# Patient Record
Sex: FEMALE | Race: WHITE | Employment: OTHER | URBAN - METROPOLITAN AREA
[De-identification: names, ages, dates, MRNs, and addresses within clinical notes are randomized per-mention and may not be internally consistent; named-entity substitution may affect disease eponyms.]

---

## 2017-11-11 ENCOUNTER — HOSPITAL ENCOUNTER (OUTPATIENT)
Dept: VASCULAR LAB | Age: 67
Discharge: HOME | End: 2017-11-11
Attending: ORTHOPAEDIC SURGERY | Admitting: ORTHOPAEDIC SURGERY

## 2017-11-11 DIAGNOSIS — M79.89 OTHER SPECIFIED SOFT TISSUE DISORDERS (CODE): ICD-10-CM

## 2018-10-29 ENCOUNTER — HOSPITAL ENCOUNTER (OUTPATIENT)
Dept: GENERAL RADIOLOGY | Age: 68
Discharge: HOME | End: 2018-10-29
Payer: MEDICARE

## 2018-10-29 ENCOUNTER — HOSPITAL ENCOUNTER (OUTPATIENT)
Age: 68
Discharge: HOME | End: 2018-10-29
Payer: MEDICARE

## 2018-10-29 DIAGNOSIS — J45.40 MODERATE PERSISTENT ASTHMA, UNSPECIFIED WHETHER COMPLICATED: ICD-10-CM

## 2018-10-29 PROCEDURE — 71046 X-RAY EXAM CHEST 2 VIEWS: CPT

## 2018-11-14 ENCOUNTER — HOSPITAL ENCOUNTER (OUTPATIENT)
Dept: CT IMAGING | Age: 68
Discharge: HOME | End: 2018-11-14
Payer: MEDICARE

## 2018-11-14 ENCOUNTER — HOSPITAL ENCOUNTER (OUTPATIENT)
Age: 68
Discharge: HOME | End: 2018-11-14
Payer: MEDICARE

## 2018-11-14 DIAGNOSIS — R05.9 COUGH: ICD-10-CM

## 2018-11-14 DIAGNOSIS — J45.41: ICD-10-CM

## 2018-11-14 LAB
BASOPHILS ABSOLUTE: 0 K/UL (ref 0–0.2)
BASOPHILS RELATIVE PERCENT: 0.3 %
EOSINOPHILS ABSOLUTE: 0.1 K/UL (ref 0–0.6)
EOSINOPHILS RELATIVE PERCENT: 2 %
HCT VFR BLD CALC: 40.4 % (ref 36–48)
HEMOGLOBIN: 13.4 G/DL (ref 12–16)
LYMPHOCYTES ABSOLUTE: 1.1 K/UL (ref 1–5.1)
LYMPHOCYTES RELATIVE PERCENT: 15.6 %
MCH RBC QN AUTO: 32.5 PG (ref 26–34)
MCHC RBC AUTO-ENTMCNC: 33.1 G/DL (ref 31–36)
MCV RBC AUTO: 98.3 FL (ref 80–100)
MONOCYTES ABSOLUTE: 0.5 K/UL (ref 0–1.3)
MONOCYTES RELATIVE PERCENT: 7.3 %
NEUTROPHILS ABSOLUTE: 5.3 K/UL (ref 1.7–7.7)
NEUTROPHILS RELATIVE PERCENT: 74.8 %
PDW BLD-RTO: 13.6 % (ref 12.4–15.4)
PLATELET # BLD: 181 K/UL (ref 135–450)
PMV BLD AUTO: 7.9 FL (ref 5–10.5)
RBC # BLD: 4.12 M/UL (ref 4–5.2)
WBC # BLD: 7.1 K/UL (ref 4–11)

## 2018-11-14 PROCEDURE — 36415 COLL VENOUS BLD VENIPUNCTURE: CPT

## 2018-11-14 PROCEDURE — 85025 COMPLETE CBC W/AUTO DIFF WBC: CPT

## 2018-11-14 PROCEDURE — 71250 CT THORAX DX C-: CPT

## 2022-08-11 NOTE — PROGRESS NOTES

## 2022-08-11 NOTE — PROGRESS NOTES
Obstructive Sleep Apnea (CIARA) Screening     Patient:  Brandee Smith    YOB: 1950      Medical Record #:  [id number]                     Date:  8/11/2022     1. Are you a loud and/or regular snorer? []  Yes       [x] No    2. Have you been observed to gasp or stop breathing during sleep? []  Yes       [x] No    3. Do you feel tired or groggy upon awakening or do you awaken with a headache?           []  Yes       [] No    4. Are you often tired or fatigued during the wake time hours? []  Yes       [] No    5. Do you fall asleep sitting, reading, watching TV or driving? []  Yes       [] No    6. Do you often have problems with memory or concentration? []  Yes       [] No    **If patient's score is ? 3 they are considered high risk for CIARA. An Anesthesia provider will evaluate the patient and develop a plan of care the day of surgery. Note:  If the patient's BMI is more than 35 kg m¯² , has neck circumference > 40 cm, and/or high blood pressure the risk is greater (© [employer], 2006).

## 2022-08-18 ENCOUNTER — ANESTHESIA (OUTPATIENT)
Dept: ENDOSCOPY | Age: 72
End: 2022-08-18
Payer: MEDICARE

## 2022-08-18 ENCOUNTER — ANESTHESIA EVENT (OUTPATIENT)
Dept: ENDOSCOPY | Age: 72
End: 2022-08-18
Payer: MEDICARE

## 2022-08-18 ENCOUNTER — HOSPITAL ENCOUNTER (OUTPATIENT)
Age: 72
Setting detail: OUTPATIENT SURGERY
Discharge: HOME | End: 2022-08-18
Attending: INTERNAL MEDICINE | Admitting: INTERNAL MEDICINE
Payer: MEDICARE

## 2022-08-18 VITALS
RESPIRATION RATE: 12 BRPM | HEIGHT: 65 IN | DIASTOLIC BLOOD PRESSURE: 61 MMHG | TEMPERATURE: 97.8 F | HEART RATE: 68 BPM | WEIGHT: 165 LBS | OXYGEN SATURATION: 99 % | SYSTOLIC BLOOD PRESSURE: 107 MMHG | BODY MASS INDEX: 27.49 KG/M2

## 2022-08-18 PROCEDURE — 6360000002 HC RX W HCPCS: Performed by: NURSE ANESTHETIST, CERTIFIED REGISTERED

## 2022-08-18 PROCEDURE — 2709999900 HC NON-CHARGEABLE SUPPLY: Performed by: INTERNAL MEDICINE

## 2022-08-18 PROCEDURE — 3700000000 HC ANESTHESIA ATTENDED CARE: Performed by: INTERNAL MEDICINE

## 2022-08-18 PROCEDURE — 7100000011 HC PHASE II RECOVERY - ADDTL 15 MIN: Performed by: INTERNAL MEDICINE

## 2022-08-18 PROCEDURE — 2580000003 HC RX 258: Performed by: ANESTHESIOLOGY

## 2022-08-18 PROCEDURE — 7100000010 HC PHASE II RECOVERY - FIRST 15 MIN: Performed by: INTERNAL MEDICINE

## 2022-08-18 PROCEDURE — 2500000003 HC RX 250 WO HCPCS: Performed by: NURSE ANESTHETIST, CERTIFIED REGISTERED

## 2022-08-18 PROCEDURE — 3609027000 HC COLONOSCOPY: Performed by: INTERNAL MEDICINE

## 2022-08-18 PROCEDURE — 3700000001 HC ADD 15 MINUTES (ANESTHESIA): Performed by: INTERNAL MEDICINE

## 2022-08-18 RX ORDER — SODIUM CHLORIDE, SODIUM LACTATE, POTASSIUM CHLORIDE, CALCIUM CHLORIDE 600; 310; 30; 20 MG/100ML; MG/100ML; MG/100ML; MG/100ML
INJECTION, SOLUTION INTRAVENOUS ONCE
Status: DISCONTINUED | OUTPATIENT
Start: 2022-08-18 | End: 2022-08-18 | Stop reason: HOSPADM

## 2022-08-18 RX ORDER — PROPOFOL 10 MG/ML
INJECTION, EMULSION INTRAVENOUS PRN
Status: DISCONTINUED | OUTPATIENT
Start: 2022-08-18 | End: 2022-08-18 | Stop reason: SDUPTHER

## 2022-08-18 RX ORDER — SODIUM CHLORIDE, SODIUM LACTATE, POTASSIUM CHLORIDE, CALCIUM CHLORIDE 600; 310; 30; 20 MG/100ML; MG/100ML; MG/100ML; MG/100ML
INJECTION, SOLUTION INTRAVENOUS CONTINUOUS
Status: DISCONTINUED | OUTPATIENT
Start: 2022-08-18 | End: 2022-08-18 | Stop reason: HOSPADM

## 2022-08-18 RX ORDER — SODIUM CHLORIDE 0.9 % (FLUSH) 0.9 %
5-40 SYRINGE (ML) INJECTION PRN
Status: DISCONTINUED | OUTPATIENT
Start: 2022-08-18 | End: 2022-08-18 | Stop reason: HOSPADM

## 2022-08-18 RX ORDER — GLYCOPYRROLATE 0.2 MG/ML
INJECTION INTRAMUSCULAR; INTRAVENOUS PRN
Status: DISCONTINUED | OUTPATIENT
Start: 2022-08-18 | End: 2022-08-18 | Stop reason: SDUPTHER

## 2022-08-18 RX ORDER — SODIUM CHLORIDE 9 MG/ML
INJECTION, SOLUTION INTRAVENOUS PRN
Status: DISCONTINUED | OUTPATIENT
Start: 2022-08-18 | End: 2022-08-18 | Stop reason: HOSPADM

## 2022-08-18 RX ORDER — LIDOCAINE HYDROCHLORIDE 10 MG/ML
0.1 INJECTION, SOLUTION EPIDURAL; INFILTRATION; INTRACAUDAL; PERINEURAL
Status: DISCONTINUED | OUTPATIENT
Start: 2022-08-18 | End: 2022-08-18 | Stop reason: HOSPADM

## 2022-08-18 RX ORDER — SODIUM CHLORIDE 0.9 % (FLUSH) 0.9 %
5-40 SYRINGE (ML) INJECTION EVERY 12 HOURS SCHEDULED
Status: DISCONTINUED | OUTPATIENT
Start: 2022-08-18 | End: 2022-08-18 | Stop reason: HOSPADM

## 2022-08-18 RX ORDER — LIDOCAINE HYDROCHLORIDE 10 MG/ML
0.3 INJECTION, SOLUTION EPIDURAL; INFILTRATION; INTRACAUDAL; PERINEURAL
Status: DISCONTINUED | OUTPATIENT
Start: 2022-08-18 | End: 2022-08-18 | Stop reason: HOSPADM

## 2022-08-18 RX ADMIN — LIDOCAINE HYDROCHLORIDE 30 MG: 10 INJECTION, SOLUTION INFILTRATION; PERINEURAL at 13:13

## 2022-08-18 RX ADMIN — PROPOFOL 50 MG: 10 INJECTION, EMULSION INTRAVENOUS at 13:11

## 2022-08-18 RX ADMIN — PROPOFOL 20 MG: 10 INJECTION, EMULSION INTRAVENOUS at 13:33

## 2022-08-18 RX ADMIN — PROPOFOL 20 MG: 10 INJECTION, EMULSION INTRAVENOUS at 13:29

## 2022-08-18 RX ADMIN — GLYCOPYRROLATE 0.2 MG: 0.2 INJECTION, SOLUTION INTRAMUSCULAR; INTRAVENOUS at 13:17

## 2022-08-18 RX ADMIN — PROPOFOL 20 MG: 10 INJECTION, EMULSION INTRAVENOUS at 13:23

## 2022-08-18 RX ADMIN — SODIUM CHLORIDE, POTASSIUM CHLORIDE, SODIUM LACTATE AND CALCIUM CHLORIDE: 600; 310; 30; 20 INJECTION, SOLUTION INTRAVENOUS at 12:41

## 2022-08-18 RX ADMIN — LIDOCAINE HYDROCHLORIDE 50 MG: 10 INJECTION, SOLUTION INFILTRATION; PERINEURAL at 13:10

## 2022-08-18 RX ADMIN — PROPOFOL 20 MG: 10 INJECTION, EMULSION INTRAVENOUS at 13:15

## 2022-08-18 RX ADMIN — PROPOFOL 20 MG: 10 INJECTION, EMULSION INTRAVENOUS at 13:27

## 2022-08-18 RX ADMIN — PROPOFOL 40 MG: 10 INJECTION, EMULSION INTRAVENOUS at 13:37

## 2022-08-18 RX ADMIN — PROPOFOL 20 MG: 10 INJECTION, EMULSION INTRAVENOUS at 13:19

## 2022-08-18 RX ADMIN — PROPOFOL 20 MG: 10 INJECTION, EMULSION INTRAVENOUS at 13:35

## 2022-08-18 RX ADMIN — PROPOFOL 40 MG: 10 INJECTION, EMULSION INTRAVENOUS at 13:39

## 2022-08-18 RX ADMIN — PROPOFOL 20 MG: 10 INJECTION, EMULSION INTRAVENOUS at 13:31

## 2022-08-18 RX ADMIN — PROPOFOL 30 MG: 10 INJECTION, EMULSION INTRAVENOUS at 13:12

## 2022-08-18 RX ADMIN — PROPOFOL 20 MG: 10 INJECTION, EMULSION INTRAVENOUS at 13:21

## 2022-08-18 RX ADMIN — PROPOFOL 20 MG: 10 INJECTION, EMULSION INTRAVENOUS at 13:18

## 2022-08-18 RX ADMIN — PROPOFOL 20 MG: 10 INJECTION, EMULSION INTRAVENOUS at 13:13

## 2022-08-18 RX ADMIN — PROPOFOL 20 MG: 10 INJECTION, EMULSION INTRAVENOUS at 13:25

## 2022-08-18 RX ADMIN — PROPOFOL 20 MG: 10 INJECTION, EMULSION INTRAVENOUS at 13:16

## 2022-08-18 ASSESSMENT — PAIN - FUNCTIONAL ASSESSMENT: PAIN_FUNCTIONAL_ASSESSMENT: 0-10

## 2022-08-18 ASSESSMENT — PAIN SCALES - GENERAL: PAINLEVEL_OUTOF10: 0

## 2022-08-18 NOTE — H&P
Gastroenterology Note             Preperative History and Physical    Patient: Joey Camacho  : 1950  CSN:     History Obtained From:  patient and/or guardian. HISTORY OF PRESENT ILLNESS:    The patient is a 80 y.o. female  here for colonoscopy. 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 Current Outpatient Medications on File Prior to Encounter   Medication Sig Dispense Refill    Denosumab (PROLIA SC) Inject into the skin every 6 months      fluticasone-salmeterol (ADVAIR) 100-50 MCG/DOSE diskus inhaler Inhale 2 puffs into the lungs every 12 hours      Multiple Vitamins-Minerals (MULTIVITAMIN PO) Take by mouth daily      Glucosamine HCl (GLUCOSAMINE PO) Take by mouth daily      Ascorbic Acid (VITAMIN C PO) Take by mouth daily      Calcium Carb-Cholecalciferol 1000-800 MG-UNIT TABS Take by mouth daily      albuterol sulfate  (90 Base) MCG/ACT inhaler Inhale 2 puffs into the lungs every 6 hours as needed for Wheezing          Allergies:  Patient has no known allergies.       Social History:   Social History     Tobacco Use    Smoking status: Never    Smokeless tobacco: Never   Substance Use Topics    Alcohol use: No     Family History:   Family History   Problem Relation Age of Onset    High Blood Pressure Mother     Hypothyroidism Mother     No Known Problems Father        PHYSICAL EXAM:      BP 127/68   Pulse 76   Temp 97.8 °F (36.6 °C) (Temporal)   Resp 16   Ht 5' 5\" (1.651 m)   Wt 165 lb (74.8 kg)   SpO2 97%   BMI 27.46 kg/m²  I        Heart:   RRR, normal s1s2    Lungs:  CTA bilat,  Normal effort    Abdomen:   NT, ND      ASA Grade:  ASA 2 - Patient with mild systemic disease with no functional limitations    Mallampati Class: 2          ASSESSMENT AND PLAN:    1. Patient is a 80 y.o. female here for Colonoscopy with MAC.   2.  Procedure options, risks and benefits reviewed with patient. Patient expresses understanding.     Marcelino Guerrero MD,   [address]   8/18/2022

## 2022-08-18 NOTE — DISCHARGE INSTRUCTIONS
PATIENT INSTRUCTIONS  POST-SEDATION    Fly Vargas          IMMEDIATELY FOLLOWING PROCEDURE:    Do not drive or operate machinery for the first twenty four hours after surgery. Do not make any important decisions for twenty four hours after surgery or while taking narcotic pain medications or sedatives. You should NOT BE LEFT UNATTENDED OR ALONE. A responsible adult should be with you for the rest of the day of your procedure and also during the night for your protection and safety. You may experience some light headedness. Rest at home with activity as tolerated. You may not need to go to bed, but it is important to rest for the next 24 hours. You should not engage in athletic sports such as basketball, volleyball, jogging, skating, or activities requiring refined motor skills for 24 hours. If you develop intractable nausea and vomiting or a severe headache please notify your doctor immediately. You are not expected to have any fever, but if you feel warm, take your temperature. If you have a fever 101 degrees or higher, call your doctor. If you have had an Endoscopy:   *Eat lightly for your first meal and gradually resume your normal / prescribed diet. DO NOT eat or drink until your gag reflex returns. *If you have a sore throat you may use lozenges, or salt water gargles. *If you have had a colonoscopy, do not expect a normal bowel movement for approximately three days due to the cleansing of the large intestine prior to colonoscopy. ONCE YOU ARE HOME, IF YOU SHOULD HAVE:  Difficulty in breathing, persistent nausea or vomiting, bleeding you feel is excessive, or pain that is unusual, increased abdominal bloating, or any swelling, fever / chills, call your physician. If you cannot contact your physician, but feel that your signs and symptoms need a physician's attention, go to the Emergency Department. FOLLOW-UP:    Please follow up with Dr. Cinthia Verde as scheduled or needed.     Call Dr. Denton Running if there are any GI concerns. [phone]. Repeat Colonoscopy in 5 years.

## 2022-08-18 NOTE — ANESTHESIA PRE PROCEDURE
Department of Anesthesiology  Preprocedure Note       Name:  Manish Zamarripa   Age:  80 y.o.  :  1950                                          MRN:  [id number]         Date:  2022      Surgeon: Destinee Tsang):  Joni Bacon MD    Procedure: Procedure(s):  COLONOSCOPY    Medications prior to admission:   Prior to Admission medications    Medication Sig Start Date End Date Taking?  Authorizing Provider   Denosumab (PROLIA SC) Inject into the skin every 6 months   Yes Historical Provider, MD   fluticasone-salmeterol (ADVAIR) 100-50 MCG/DOSE diskus inhaler Inhale 2 puffs into the lungs every 12 hours    Historical Provider, MD   Multiple Vitamins-Minerals (MULTIVITAMIN PO) Take by mouth daily    Historical Provider, MD   Glucosamine HCl (GLUCOSAMINE PO) Take by mouth daily    Historical Provider, MD   Ascorbic Acid (VITAMIN C PO) Take by mouth daily    Historical Provider, MD   Calcium Carb-Cholecalciferol 1000-800 MG-UNIT TABS Take by mouth daily    Historical Provider, MD   albuterol sulfate  (90 Base) MCG/ACT inhaler Inhale 2 puffs into the lungs every 6 hours as needed for Wheezing    Historical Provider, MD       Current medications:    Current Facility-Administered Medications   Medication Dose Route Frequency Provider Last Rate Last Admin    lactated ringers infusion   IntraVENous Once Joni Bacon MD        lidocaine PF 1 % injection 0.1 mL  0.1 mL IntraDERmal Once PRN Joni Bacon MD        lidocaine PF 1 % injection 0.3 mL  0.3 mL IntraDERmal Once PRN Elba Luna MD        lactated ringers infusion   IntraVENous Continuous Elba Luna MD        sodium chloride flush 0.9 % injection 5-40 mL  5-40 mL IntraVENous 2 times per day Elba Luna MD        sodium chloride flush 0.9 % injection 5-40 mL  5-40 mL IntraVENous PRN Elba Luna MD        0.9 % sodium chloride infusion   IntraVENous PRN Elba Luna MD           Allergies:  No Known Allergies    Problem List:  There is no problem list on file for this patient. 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    JOINT REPLACEMENT Left     TKR    KNEE CARTILAGE SURGERY Bilateral        Social History:    Social History     Tobacco Use    Smoking status: Never    Smokeless tobacco: Never   Substance Use Topics    Alcohol use: No                                Counseling given: Not Answered      Vital Signs (Current):   Vitals:    22 1400 22 1230   BP:  127/68   Pulse:  76   Resp:  16   Temp:  97.8 °F (36.6 °C)   TempSrc:  Temporal   SpO2:  97%   Weight: 165 lb (74.8 kg) 165 lb (74.8 kg)   Height: 5' 5\" (1.651 m) 5' 5\" (1.651 m)                                              BP Readings from Last 3 Encounters:   22 127/68   17 114/64       NPO Status: Time of last liquid consumption: 0800                        Time of last solid consumption: 1700                        Date of last liquid consumption: 22                        Date of last solid food consumption: 22    BMI:   Wt Readings from Last 3 Encounters:   22 165 lb (74.8 kg)   17 156 lb 12 oz (71.1 kg)     Body mass index is 27.46 kg/m².     CBC:   Lab Results   Component Value Date/Time    WBC 7.1 2018 10:13 AM    RBC 4.12 2018 10:13 AM    HGB 13.4 2018 10:13 AM    HCT 40.4 2018 10:13 AM    MCV 98.3 2018 10:13 AM    RDW 13.6 2018 10:13 AM     2018 10:13 AM       CMP:   Lab Results   Component Value Date/Time     2017 11:36 AM    K 4.2 2017 11:36 AM     2017 11:36 AM    CO2 26 11/11/2017 11:36 AM    BUN 17 11/11/2017 11:36 AM    CREATININE 0.5 11/11/2017 11:36 AM    GFRAA >60 11/11/2017 11:36 AM    AGRATIO 1.5 11/11/2017 11:36 AM    LABGLOM >60 11/11/2017 11:36 AM    GLUCOSE 87 11/11/2017 11:36 AM    PROT 6.2 11/11/2017 11:36 AM    CALCIUM 8.6 11/11/2017 11:36 AM    BILITOT 0.3 11/11/2017 11:36 AM    ALKPHOS 59 11/11/2017 11:36 AM    AST 23 11/11/2017 11:36 AM    ALT 20 11/11/2017 11:36 AM       POC Tests: No results for input(s): POCGLU, POCNA, POCK, POCCL, POCBUN, POCHEMO, POCHCT in the last 72 hours. Coags:   Lab Results   Component Value Date/Time    PROTIME 10.9 11/11/2017 11:36 AM    INR 0.96 11/11/2017 11:36 AM    APTT 30.2 11/11/2017 11:36 AM       HCG (If Applicable): No results found for: PREGTESTUR, PREGSERUM, HCG, HCGQUANT     ABGs: No results found for: PHART, PO2ART, UUY8AFL, WZF7GBV, BEART, S9HXIKTE     Type & Screen (If Applicable):  No results found for: LABABO, LABRH    Drug/Infectious Status (If Applicable):  No results found for: HIV, HEPCAB    COVID-19 Screening (If Applicable): No results found for: COVID19        Anesthesia Evaluation  Patient summary reviewed and Nursing notes reviewed no history of anesthetic complications:   Airway: Mallampati: II          Dental:    (+) upper braces and lower braces      Pulmonary:normal exam    (+) asthma:                            Cardiovascular:Negative CV ROS                      Neuro/Psych:   Negative Neuro/Psych ROS              GI/Hepatic/Renal: Neg GI/Hepatic/Renal ROS            Endo/Other: Negative Endo/Other ROS                    Abdominal:             Vascular: negative vascular ROS. Other Findings:           Anesthesia Plan      MAC     ASA 2       Induction: intravenous. Anesthetic plan and risks discussed with patient. Plan discussed with CRNA.     Attending anesthesiologist reviewed and agrees with Lalit Thao MD 8/18/2022

## 2022-08-18 NOTE — ANESTHESIA POSTPROCEDURE EVALUATION
Department of Anesthesiology  Postprocedure Note    Patient: Hugo Koo  MRN: [id number]  YOB: 1950  Date of evaluation: 8/18/2022      Procedure Summary     Date: 08/18/22 [facility] [place] [address] / [facility]    Anesthesia Start: 1308 Anesthesia Stop: 1343    Procedure: COLONOSCOPY Diagnosis:       Polyp of colon, unspecified part of colon, unspecified type      (COLON POLYPS)    Surgeons: Jazmin Hawthorne MD Responsible Provider: Chuck Teixeira MD    Anesthesia Type: MAC ASA Status: 2          Anesthesia Type: No value filed.     Dayana Phase I: Dayana Score: 10    Dayana Phase II: Dayana Score: 10      Anesthesia Post Evaluation    Patient location during evaluation: PACU  Patient participation: complete - patient participated  Level of consciousness: awake and alert  Pain score: 0  Airway patency: patent  Nausea & Vomiting: no nausea  Complications: no  Cardiovascular status: blood pressure returned to baseline  Respiratory status: acceptable  Hydration status: euvolemic

## (undated) DEVICE — ELECTRODE,ECG,STRESS,FOAM,3PK: Brand: MEDLINE

## (undated) DEVICE — ENDOSCOPIC KIT 2 12 FT OP4 DE2 GWN SYR

## (undated) DEVICE — CANNULA NSL AD TBNG L7FT PVC STR NONFLARED PRNG O2 DEL W STD